# Patient Record
Sex: FEMALE | Race: BLACK OR AFRICAN AMERICAN | NOT HISPANIC OR LATINO | Employment: OTHER | URBAN - METROPOLITAN AREA
[De-identification: names, ages, dates, MRNs, and addresses within clinical notes are randomized per-mention and may not be internally consistent; named-entity substitution may affect disease eponyms.]

---

## 2017-12-30 RX ORDER — INDOMETHACIN 25 MG/1
25 CAPSULE ORAL 3 TIMES DAILY PRN
Qty: 42 CAPSULE | Refills: 1 | Status: SHIPPED | OUTPATIENT
Start: 2017-12-30

## 2019-08-29 ENCOUNTER — TRANSCRIBE ORDERS (OUTPATIENT)
Dept: PHYSICAL THERAPY | Facility: HOSPITAL | Age: 69
End: 2019-08-29

## 2019-08-29 DIAGNOSIS — G89.29 CHRONIC LEFT HIP PAIN: Primary | ICD-10-CM

## 2019-08-29 DIAGNOSIS — M25.552 CHRONIC LEFT HIP PAIN: Primary | ICD-10-CM

## 2019-09-20 ENCOUNTER — TREATMENT (OUTPATIENT)
Dept: PHYSICAL THERAPY | Facility: CLINIC | Age: 69
End: 2019-09-20

## 2019-09-20 DIAGNOSIS — M25.60 STIFF JOINT: ICD-10-CM

## 2019-09-20 DIAGNOSIS — M25.552 ACUTE HIP PAIN, LEFT: Primary | ICD-10-CM

## 2019-09-20 PROCEDURE — 97161 PT EVAL LOW COMPLEX 20 MIN: CPT | Performed by: PHYSICAL THERAPIST

## 2019-09-20 PROCEDURE — 97110 THERAPEUTIC EXERCISES: CPT | Performed by: PHYSICAL THERAPIST

## 2019-09-20 NOTE — PROGRESS NOTES
Physical Therapy Initial Evaluation and Plan of Care      Patient: Teresa Valente   : 1950  Diagnosis/ICD-10 Code:  Acute hip pain, left [M25.552]  Referring practitioner: Cira Ugalde MD  Date of Initial Visit: 2019  Today's Date: 2019  Patient seen for 1 sessions           Subjective Evaluation    History of Present Illness  Mechanism of injury: Patient complains of L hip pain that began insidiously 3-4 month ago. The pain is intermittent and she describes it as a dull ache. It doesn't bother her daily. The pain bothers her the most while sitting but occassionally it causes discomfort when walking. Patient is a member at [employer] and reports some of the exercises she was doing for her knee aggravate her hip (squats, leg press). PMH of B TKAs ( L,  R).      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) Quality of life: good    Pain  Current pain ratin  At best pain ratin  At worst pain ratin  Location: L hip  Quality: dull ache  Relieving factors: change in position and medications (tylenol)  Aggravating factors: prolonged positioning, ambulation and stairs (sitting)  Progression: worsening    Social Support  Lives in: multiple-level home  Lives with: significant other    Hand dominance: left    Treatments  Previous treatment: medication  Patient Goals  Patient goals for therapy: decreased pain and increased motion             Objective       Postural Observations  Seated posture: fair  Standing posture: fair    Additional Postural Observation Details  Forward head, rounded shoulders, moderate kyphosis    Strength/Myotome Testing     Left Hip   Planes of Motion   Flexion: 3+  Abduction: 3+    Right Hip   Planes of Motion   Flexion: 4-    Left Knee   Flexion: 4+  Extension: 4+    Right Knee   Flexion: 4+  Extension: 4+    Left Ankle/Foot   Dorsiflexion: 4+  Plantar flexion: 4+    Right Ankle/Foot   Dorsiflexion: 4+  Plantar flexion: 4+    Tests      Left Hip   Positive TUTU.   Negative scour.     Ambulation     Comments   Wide HANH with feet externally rotated, mild L LE antalgia     See Exercise, Manual, and Modality Logs for complete treatment.    Assessment & Plan     Assessment  Impairments: abnormal gait, activity intolerance, impaired physical strength and pain with function  Assessment details: Teresa Valente is a 69 y.o. year-old female referred to physical therapy for acute left hip pain. The pain started insidiously about 3-4 months ago and feels like a dull ache. It seems to worsen with prolonged sitting and sometimes with walking. She presents with a evolving clinical presentation.  She has comorbidities of B knee pain from prior TKAs which may affect her progress in the plan of care. There are no known personal factors at this time. Signs and symptoms are consistent with physical therapy diagnosis of acute left hip pain. Positive Fabers and weakness in L hip indicate possibility of arthritis. Patient is appropriate for skilled aquatic physical therapy in order to reduce pain and increase ease with daily mobility.   Prognosis: good  Functional Limitations: walking, sitting and standing  Goals  Plan Goals: Short Term Goals for completion in 30 days:   -Patient will report a reduction in pain for 12-24 hours or greater following aquatic therapy session  -Patient will demonstrate good core stabilization strength with advanced  aquatic exercises such as bicycle kicks and tuck ups to help improve postural stability    LTGs for completion within 90 days:  -Patient will demonstrate independence with water walks and stretches to promote independent managment of condition  -Patient will improve 5xSTS from 14 seconds to </=12 seconds in order to decrease risk of falls and increase functional strength  -Patient will increase L hip flex strength to 4/5 or greater to increase LE stability during gait  -Patient will improve score on HOOS from 10 to </=7 in  order to improve quality of life    Plan  Therapy options: will be seen for skilled physical therapy services  Planned therapy interventions: stretching, therapeutic activities, strengthening, home exercise program and gait training  Other planned therapy interventions: Aquatic therapy  Frequency: 36 visits.  Plan details: Aquatic therapy for core stabilization, LE strength/stability, gait training, balance, and posture        Timed:  Manual Therapy:         mins  92663;  Therapeutic Exercise:    8     mins  17493;     Neuromuscular [place]:        mins  48357;    Therapeutic Activity:          mins  29033;     Gait Training:           mins  04410;     Ultrasound:          mins  99444;    Electrical Stimulation:         mins  32162 ( );  Iontophoresis         mins 26993      Untimed:  Electrical Stimulation:         mins  34180 ( );  Mechanical Traction:         mins  66738;     Timed Treatment:   8   mins   Total Treatment:     38   mins    PT SIGNATURE: Chary Gant PT   DATE TREATMENT INITIATED: 9/20/2019    Initial Certification  Certification Period: 12/19/2019  I certify that the therapy services are furnished while this patient is under my care.  The services outlined above are required by this patient, and will be reviewed every 90 days.     PHYSICIAN: Cira Ugalde MD      DATE:     Please sign and return via fax to  .. Thank you, [facility].

## 2019-09-23 ENCOUNTER — TREATMENT (OUTPATIENT)
Dept: PHYSICAL THERAPY | Facility: CLINIC | Age: 69
End: 2019-09-23

## 2019-09-23 DIAGNOSIS — M25.60 STIFF JOINT: ICD-10-CM

## 2019-09-23 DIAGNOSIS — M25.552 ACUTE HIP PAIN, LEFT: Primary | ICD-10-CM

## 2019-09-23 PROCEDURE — 97113 AQUATIC THERAPY/EXERCISES: CPT | Performed by: PHYSICAL THERAPIST

## 2019-09-23 NOTE — PROGRESS NOTES
Physical Therapy Daily Progress Note    Patient: Teresa Valente   : 1950  Diagnosis/ICD-10 Code:  Acute hip pain, left [M25.552]  Referring practitioner: Cira Ugalde MD  Date of Initial Visit: Type: THERAPY  Noted: 2019  Today's Date: 2019  Patient seen for 2 sessions             Subjective Evaluation    Pain  Current pain ratin           Objective   See Exercise, Manual, and Modality Logs for complete treatment.       Assessment & Plan     Assessment  Assessment details: Tenderness L greater trochanter. Instructed patient in aquatic exercise for treatment of L hip pain. Session completed without aggravation of L hip symptoms. Teresa is unsteady in the pool, SBA for all exercises.        Progress per Plan of Care           Timed:  Aquatic Therapy    40     mins 81168;    Kosta Hager, PT  Physical Therapist

## 2019-09-30 ENCOUNTER — TREATMENT (OUTPATIENT)
Dept: PHYSICAL THERAPY | Facility: CLINIC | Age: 69
End: 2019-09-30

## 2019-09-30 DIAGNOSIS — M25.552 ACUTE HIP PAIN, LEFT: Primary | ICD-10-CM

## 2019-09-30 DIAGNOSIS — M25.60 STIFF JOINT: ICD-10-CM

## 2019-09-30 PROCEDURE — 97113 AQUATIC THERAPY/EXERCISES: CPT | Performed by: PHYSICAL THERAPIST

## 2019-09-30 NOTE — PROGRESS NOTES
Physical Therapy Daily Progress Note    Patient: Teresa Valente   : 1950  Diagnosis/ICD-10 Code:  Acute hip pain, left [M25.552]  Referring practitioner: Cira Ugalde MD  Date of Initial Visit: Type: THERAPY  Noted: 2019  Today's Date: 2019  Patient seen for 3 sessions             Subjective Evaluation    History of Present Illness    Subjective comment: I did really well after last session, no increased symptomsPain  Current pain rating: 3           Objective   See Exercise, Manual, and Modality Logs for complete treatment.       Assessment & Plan     Assessment  Assessment details: Patient indicates she felt good without any issues after last initial aquatic session.  Continued with aquatic ex as previous progressing a few and added paddle rows in partial squat (B and alt) and latera step ups without issue during session.  She was able to perform some LE ex without UE support but did need rail support on a few.  Intermittent cuing for posture, core activation, and correct form/technique with therex.     Plan:  Continue working on hip mobility, flexibility, and core/hip strength/stabilization advancing as appropriate/tolerated.          Progress per Plan of Care and Progress strengthening /stabilization /functional activity           Timed:  Aquatic Therapy    30     mins 82524;    Quita Berger, PT  Physical Therapist

## 2019-10-02 ENCOUNTER — TREATMENT (OUTPATIENT)
Dept: PHYSICAL THERAPY | Facility: CLINIC | Age: 69
End: 2019-10-02

## 2019-10-02 DIAGNOSIS — M25.552 ACUTE HIP PAIN, LEFT: Primary | ICD-10-CM

## 2019-10-02 DIAGNOSIS — M25.60 STIFF JOINT: ICD-10-CM

## 2019-10-02 PROCEDURE — 97113 AQUATIC THERAPY/EXERCISES: CPT | Performed by: PHYSICAL THERAPIST

## 2019-10-02 NOTE — PROGRESS NOTES
"Physical Therapy Daily Progress Note    Patient: Teresa Valente   : 1950  Diagnosis/ICD-10 Code:  Acute hip pain, left [M25.552]  Referring practitioner: Cira Ugalde MD  Date of Initial Visit: Type: THERAPY  Noted: 2019  Today's Date: 10/2/2019  Patient seen for 4 sessions             Subjective Evaluation    History of Present Illness    Subjective comment: \"Feeling better\". No soreness after last visitPain  Current pain ratin  Location: L hip           Objective   See Exercise, Manual, and Modality Logs for complete treatment.       Assessment & Plan     Assessment  Assessment details: Patient with continued improvement in subjective report of hip pain.  Progression of ex to include tuck ups & marching in place.  Mild pain with abduction movements in water, though pain did not persist & patient with no increased pain at the end of the treatment session.  Intermittent cueing for core stabilization with exercises.    P: Patient scheduled next week, then will be out of country the following week prior to returning to aquatic therapy.        Progress strengthening /stabilization /functional activity           Timed:  Aquatic Therapy    45     mins 79640;    Betzaida Jeffery, PT  Physical Therapist    "

## 2019-10-07 ENCOUNTER — TREATMENT (OUTPATIENT)
Dept: PHYSICAL THERAPY | Facility: CLINIC | Age: 69
End: 2019-10-07

## 2019-10-07 DIAGNOSIS — M25.60 STIFF JOINT: ICD-10-CM

## 2019-10-07 DIAGNOSIS — M25.552 ACUTE HIP PAIN, LEFT: Primary | ICD-10-CM

## 2019-10-07 PROCEDURE — 97113 AQUATIC THERAPY/EXERCISES: CPT | Performed by: PHYSICAL THERAPIST

## 2019-10-07 NOTE — PROGRESS NOTES
Physical Therapy Daily Progress Note    Patient: Teresa Valente   : 1950  Diagnosis/ICD-10 Code:  Acute hip pain, left [M25.552]  Referring practitioner: Cira Ugalde MD  Date of Initial Visit: Type: THERAPY  Noted: 2019  Today's Date: 10/7/2019  Patient seen for 5 sessions             Subjective Evaluation    History of Present Illness  Mechanism of injury: Pt reports left hip pain 2/10 and that she is seeing improvement from aquatic intervention.            Objective   See Exercise, Manual, and Modality Logs for complete treatment.       Assessment & Plan     Assessment  Assessment details: Progressed to standing running man with LN support with no loss of balance. Discussed stretching strategies for while she is on trip. She feels she is doing well and is comfortable with 1 remaining visit being her last. She reports adls are improving and she feels that left hip and knee have good strength. lle mmt grossly 4/5.    Continue to progress towards dc discuss hep at next visit and make sure pt is comfortable managing left hip at home.        Progress per Plan of Care and Progress strengthening /stabilization /functional activity           Timed:  Aquatic Therapy    40     mins 65642;    D[place] Salmon, PT DPT  Physical Therapist

## 2020-02-20 ENCOUNTER — TRANSCRIBE ORDERS (OUTPATIENT)
Dept: PHYSICAL THERAPY | Facility: CLINIC | Age: 70
End: 2020-02-20

## 2020-02-20 DIAGNOSIS — M79.621 AXILLARY PAIN, RIGHT: Primary | ICD-10-CM

## 2020-02-20 DIAGNOSIS — M79.18 MUSCULOSKELETAL PAIN: ICD-10-CM

## 2020-03-04 ENCOUNTER — HOSPITAL ENCOUNTER (OUTPATIENT)
Dept: PHYSICAL THERAPY | Facility: HOSPITAL | Age: 70
Setting detail: THERAPIES SERIES
Discharge: HOME | End: 2020-03-04

## 2020-03-04 DIAGNOSIS — M25.511 CHRONIC RIGHT SHOULDER PAIN: Primary | ICD-10-CM

## 2020-03-04 DIAGNOSIS — G89.29 CHRONIC RIGHT SHOULDER PAIN: Primary | ICD-10-CM

## 2020-03-04 DIAGNOSIS — M54.6 CHRONIC RIGHT-SIDED THORACIC BACK PAIN: ICD-10-CM

## 2020-03-04 DIAGNOSIS — G89.29 CHRONIC RIGHT-SIDED THORACIC BACK PAIN: ICD-10-CM

## 2020-03-04 PROCEDURE — 97161 PT EVAL LOW COMPLEX 20 MIN: CPT

## 2020-03-04 PROCEDURE — 97110 THERAPEUTIC EXERCISES: CPT

## 2020-03-04 NOTE — THERAPY EVALUATION
Outpatient Physical Therapy Ortho Initial Evaluation  [facility]     Patient Name: Teresa Valente  : 1950  MRN: [id number]  Today's Date: 3/4/2020      Visit Date: 2020    There is no problem list on file for this patient.       Past Medical History:   Diagnosis Date   • Allergic    • Arthritis    • Asthma    • Cancer (CMS/HCC)     breast- in remission   • Sinusitis         History reviewed. No pertinent surgical history.    Visit Dx:     ICD-10-CM ICD-9-CM   1. Chronic right shoulder pain M25.511 719.41    G89.29 338.29   2. Chronic right-sided thoracic back pain M54.6 724.1    G89.29 338.29         Patient History     Row Name 20 1300             History    Chief Complaint  Pain  -LB      Type of Pain  Shoulder pain  -LB      Date Current Problem(s) Began  19  -LB      Brief Description of Current Complaint  Pt reports 3-4 months of worsening R shoulder, scapular, and mid thoracic back pain. She had R mastectomy . Over last 4 years she has had worsening R shoulder pain that began when she started going up in weight at the gym. She saw orthopedic doctor who did Xray that revealed end stage OA. She was told to have TSA 3 years ago. She is L handed. She managed symptoms with NSAIDs and avoiding movement but feels it has worsened over last 4 months. She reports worse pain with reaching overhead and spending time in forward flexed position with recent symptoms radiating along R scapula and into mid back. Pt works 3 days a week at [employer] where she occasionally does pt lifting but not usually. Otherwise she is retired. 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  -LB      Patient seeing anyone else for problem(s)?  yes  -LB      How has patient tried to help current problem?  medication  -LB      What clinical tests have you had for this problem?  X-ray  -LB      Results of Clinical Tests  endstage OA  -LB      History of Previous Related Injuries  chronic R shoulder pain  -LB         Pain     Pain Location  Shoulder  -LB      Pain at Present  4  -LB      Pain at Best  2  -LB      Pain at Worst  8  -LB      Pain Frequency  Intermittent  -LB      Pain Description  Aching;Stabbing;Sore;Spasm  -LB      What Performance Factors Make the Current Problem(s) WORSE?  reaching overhead, leaning forward for extended periods of time  -LB      What Performance Factors Make the Current Problem(s) BETTER?  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  -LB      Tolerance Time- Standing  pain if leaning forward  -LB      Tolerance Time- Sitting  no issue  -LB      Tolerance Time- Walking  no issue  -LB      Tolerance Time- Lying  unable to lay on R shoulder   -LB      Is your sleep disturbed?  No  -LB      What position do you sleep in?  Left sidelying  -LB      Difficulties at work?  Pain with pill distribution.  -LB      Difficulties with ADL's?  Pain with hair combing.  -LB      Difficulties with recreational activities?  Unable to perfrom.  -LB         Fall Risk Assessment    Any falls in the past year:  No  -LB         Services    Prior Rehab/Home Health Experiences  No  -LB      Are you currently receiving Home Health services  No  -LB      Do you plan to receive Home Health services in the near future  No  -LB         Daily Activities    Primary Language  English  -LB      Pt Participated in POC and Goals  Yes  -LB         Safety    Are you being hurt, hit, or frightened by anyone at home or in your life?  No  -LB      Are you being neglected by a caregiver  No  -LB         User Key  (r) = Recorded By, (t) = Taken By, (c) = Cosigned By    Initials Name Provider Type    LB Abbey Whyte, PT Physical Therapist          PT Ortho     Row Name 03/04/20 1400       Subjective Comments    Subjective Comments  I went to my MD bc I was worried since I had hx of mastectomy but she said I was doing ok from that standpoint and it was more shoulder related.  -LB       Subjective Pain    Able to rate subjective pain?  yes  -LB    Pre-Treatment Pain Level  4  -LB       Posture/Observations    Posture/Observations Comments  forward head, rounded shoulders  -LB       Myotomal Screen- Upper Quarter Clearing    Shoulder flexion (C5)  Right:;4- (Good -);Left:;4 (Good)  -LB    Elbow flexion/wrist extension (C6)  Bilateral:;5 (Normal)  -LB    Elbow extension/wrist flexion (C7)  Bilateral:;5 (Normal)  -LB      WNL  -LB       Cervical/Shoulder ROM Screen    Cervical flexion  Normal  -LB    Cervical extension  Normal  -LB    Cervical lateral flexion  Normal  -LB    Cervical rotation  Normal  -LB    Shoulder elevation   Normal  -LB       Shoulder Girdle Accessory Motions    Anterior glide of humerus  Right:;Hypomobile  -LB    Inferior glide of humerus  Right:;Hypomobile  -LB    Protraction mobility of scapula  Right:;Hypomobile  -LB    Retraction mobility of scapula  Right:;Hypomobile  -LB       Shoulder Girdle Palpation    Deltoid  Right:;Atrophied  -LB    Upper Trap  Right:;Guarded/taut  -LB    Middle Trap  Right:;Guarded/taut;Tender  -LB    Rhomboid  Right:;Guarded/taut;Tender;Trigger point  -LB       Right Upper Ext    Rt Shoulder Abduction AROM  80 deg  -LB    Rt Shoulder Abduction PROM  90 deg  -LB    Rt Shoulder Flexion AROM  100 deg  -LB    Rt Shoulder Flexion PROM  120 deg  -LB    Rt Shoulder External Rotation AROM  HOLLY to ipsilateral ear  -LB    Rt Shoulder External Rotation PROM  20 deg in POS  -LB    Rt Shoulder Internal Rotation AROM  FIR to L4  -LB    Rt Shoulder Internal Rotation PROM  50  deg in POS  -LB       Left Upper Ext    Lt Shoulder Abduction AROM  140 deg  -LB    Lt Shoulder Flexion AROM  150 deg  -LB    Lt Shoulder External Rotation AROM  HOLLY to C7  -LB    Lt Shoulder Internal Rotation AROM  FIR to L4  -LB       MMT (Manual Muscle Testing)    General MMT Comments  B abduction 4/5 with compensation on R  -LB       Sensation    Sensation WNL?  WNL  -LB      User Key  (r) = Recorded By, (t) = Taken By, (c) = Cosigned By    Initials Name Provider Type    LB Abbey Whyte, PT Physical Therapist                      Therapy Education  Education Details: issued HEP, discussed stepping one LE forward under cabinet for dishes/pill distribution tasks, discussed scapular mechanics, thoracic mobility  Given: Symptoms/condition management, HEP, Posture/body mechanics  Program: New  How Provided: Verbal, Demonstration, Written  Provided to: Patient  Level of Understanding: Teach back education performed, Demonstrated, Verbalized     PT OP Goals     Row Name 03/04/20 1500          PT Short Term Goals    STG Date to Achieve  03/18/20  -LB     STG 1  Pt will demonstrate understanding and compliance with initial HEP.  -LB     STG 1 Progress  New  -LB     STG 2  Pt will report improved tolerance to dish washing and pill distribution tasks by 50% or better.  -LB     STG 2 Progress  New  -LB     STG 3  Pt will demonstrate improved ER PROM to 40 deg or better.  -LB     STG 3 Progress  New  -LB        Long Term Goals    LTG Date to Achieve  04/03/20  -LB     LTG 1  Pt will demonstrate AROM RUE flexion/abduction to 120 deg to improve her ability to perform dressing and grooming tasks.  -LB     LTG 1 Progress  New  -LB     LTG 2  Pt will report pain at worse dec from 8/10 to 5/10 or less.  -LB     LTG 2 Progress  New  -LB     LTG 3  Pt will demonstrate understanding of advanced HEP to allow her to continue to manage R shoulder pain until she is ready for TSA.  -LB     LTG 3 Progress  New  -LB        Time Calculation     PT Goal Re-Cert Due Date  06/02/20  -LB       User Key  (r) = Recorded By, (t) = Taken By, (c) = Cosigned By    Initials Name Provider Type    Abbey Del Valle, BETINA Physical Therapist          PT Assessment/Plan     Row Name 03/04/20 1505          PT Assessment    Functional Limitations  Performance in work activities;Limitation in home management;Performance in self-care ADL;Limitations in functional capacity and performance;Performance in leisure activities  -LB     Impairments  Impaired flexibility;Poor body mechanics;Posture;Range of motion;Pain;Joint mobility;Muscle strength;Joint integrity  -LB     Assessment Comments  Pt is 70 y.o. female referred to outpatient physical therapy for evaluation and treatment of  evolving  right shoulder, periscapular, and thoracic spine pain that is described as an aching, radiating pain that is occasionally stabbing. It is worse with reaching activities and FF posture.  Patient presents with dec AROM/PROM of R shoulder, impaired R scapular strength, dec thoracic spine mobility, and poor posture. PMHx consistent with hx of R mastectomy 2004, chronic R shoulder pain. Personal factors affecting her care include poor posture, work tasks requiring standing in forward flexed position, end stage OA R GH joint. Pt demonstrates signs and symptoms  consistent with degenerative changes of R shoulder leading to pain due to compensatory movements. Pt is limited in their ability to participate in strength training, work tasks, grooming tasks. She will benefit from continued skilled PT services to address functional deficits. Thank you for this referral.  -LB     Please refer to paper survey for additional self-reported information  Yes  -LB     Rehab Potential  Good  -LB     Patient/caregiver participated in establishment of treatment plan and goals  Yes  -LB     Patient would benefit from skilled therapy intervention  Yes  -LB        PT Plan    PT Frequency  2x/week  -LB     Predicted  Duration of Therapy Intervention (Therapy Eval)  8-12 visits  -LB     Planned CPT's?  PT EVAL LOW COMPLEXITY: 34680;PT RE-EVAL: 85219;PT THER ACT EA 15 MIN: 20893;PT THER PROC EA 15 MIN: 97801;PT MANUAL THERAPY EA 15 MIN: 73619;PT NEUROMUSC RE-EDUCATION EA 15 MIN: 34121;PT SELF CARE/HOME MGMT/TRAIN EA 15: 05422;PT HOT OR COLD PACK TREAT MCARE;PT HOT/COLD PACK WC NONMCARE: 48716  -LB     PT Plan Comments  Assess tolerance to HEP, continue thoracic mobility, scapular strengthening, consider supine on pool noodle alternating flexion, seated thoracic extension, tband row/extension, lat pull, RTC strengthening as tolerated.  -LB       User Key  (r) = Recorded By, (t) = Taken By, (c) = Cosigned By    Initials Name Provider Type    Abbey Del Valle, PT Physical Therapist            OP Exercises     Row Name 03/04/20 1400             Subjective Comments    Subjective Comments  I went to my MD bc I was worried since I had hx of mastectomy but she said I was doing ok from that standpoint and it was more shoulder related.  -LB         Subjective Pain    Able to rate subjective pain?  yes  -LB      Pre-Treatment Pain Level  4  -LB         Total Minutes    00143 - PT Therapeutic Exercise Minutes  15  -LB         Exercise 1    Exercise Name 1  shoulder rolls  -LB      Reps 1  10  -LB         Exercise 2    Exercise Name 2  scapular retraction  -LB      Reps 2  10  -LB      Time 2  5  -LB         Exercise 3    Exercise Name 3  supine AAROM flexion  -LB      Reps 3  10  -LB      Time 3  5  -LB         Exercise 4    Exercise Name 4  supine HA  -LB      Reps 4  10  -LB      Additional Comments  RTB  -LB        User Key  (r) = Recorded By, (t) = Taken By, (c) = Cosigned By    Initials Name Provider Type    Abbey Del Valle, PT Physical Therapist                                  Time Calculation:     Start Time: 1345  Stop Time: 1430  Time Calculation (min): 45 min  Total Timed Code Minutes- PT: 15 minute(s)     Therapy Charges for  Today     Code Description Service Date Service Provider Modifiers Qty    91728935434 HC PT THER PROC EA 15 MIN 3/4/2020 Abbey Whyte, PT GP 1    71294707696 HC PT EVAL LOW COMPLEXITY 2 3/4/2020 Abbey Whyte, PT GP 1                    Abbey Whyte, PT  3/4/2020

## 2020-03-17 ENCOUNTER — APPOINTMENT (OUTPATIENT)
Dept: PHYSICAL THERAPY | Facility: HOSPITAL | Age: 70
End: 2020-03-17

## 2020-03-19 ENCOUNTER — HOSPITAL ENCOUNTER (OUTPATIENT)
Dept: PHYSICAL THERAPY | Facility: HOSPITAL | Age: 70
Setting detail: THERAPIES SERIES
Discharge: HOME | End: 2020-03-19

## 2020-03-19 DIAGNOSIS — M25.552 ACUTE HIP PAIN, LEFT: ICD-10-CM

## 2020-03-19 DIAGNOSIS — M25.511 CHRONIC RIGHT SHOULDER PAIN: Primary | ICD-10-CM

## 2020-03-19 DIAGNOSIS — G89.29 CHRONIC RIGHT SHOULDER PAIN: Primary | ICD-10-CM

## 2020-03-19 DIAGNOSIS — M54.6 CHRONIC RIGHT-SIDED THORACIC BACK PAIN: ICD-10-CM

## 2020-03-19 DIAGNOSIS — G89.29 CHRONIC RIGHT-SIDED THORACIC BACK PAIN: ICD-10-CM

## 2020-03-19 DIAGNOSIS — M25.60 STIFF JOINT: ICD-10-CM

## 2020-03-19 PROCEDURE — 97110 THERAPEUTIC EXERCISES: CPT

## 2020-03-19 PROCEDURE — 97140 MANUAL THERAPY 1/> REGIONS: CPT

## 2020-03-19 NOTE — THERAPY TREATMENT NOTE
Outpatient Physical Therapy Ortho Treatment Note  [facility]     Patient Name: Teresa Valente  : 1950  MRN: [id number]  Today's Date: 3/19/2020      Visit Date: 2020    Visit Dx:    ICD-10-CM ICD-9-CM   1. Chronic right shoulder pain M25.511 719.41    G89.29 338.29   2. Chronic right-sided thoracic back pain M54.6 724.1    G89.29 338.29   3. Acute hip pain, left M25.552 719.45   4. Stiff joint M25.60 719.50       There is no problem list on file for this patient.       Past Medical History:   Diagnosis Date   • Allergic    • Arthritis    • Asthma    • Cancer (CMS/[facility])     breast- in remission   • Sinusitis         No past surgical history on file.    PT Ortho     Row Name 20 1500       Right Upper Ext    Rt Shoulder Abduction PROM  100  -SI    Rt Shoulder Flexion PROM  136  -SI    Rt Shoulder External Rotation PROM  40  -SI    Rt Shoulder Internal Rotation PROM  60  -SI    Rt Upper Extremity Comments   pain end ranges with PROM  -SI    Row Name 20 1400       Subjective Comments    Subjective Comments  pain about 7 because just off work and lot of lifting.  will now be off work  -SI       Subjective Pain    Able to rate subjective pain?  yes  -SI    Pre-Treatment Pain Level  7  -SI      User Key  (r) = Recorded By, (t) = Taken By, (c) = Cosigned By    Initials Name Provider Type    Loren Velasqeuz PTA Physical Therapy Assistant                      PT Assessment/Plan     Row Name 20 1534          PT Assessment    Assessment Comments  pt tolerated ex well in PT and fels she knows current HEP.  -SI       User Key  (r) = Recorded By, (t) = Taken By, (c) = Cosigned By    Initials Name Provider Type    Loren Velasquez PTA Physical Therapy Assistant            OP Exercises     Row Name 20 1500 20 1400          Subjective Comments    Subjective Comments  --  pain about 7 because just off work and lot of lifting.  will now be off work  -SI        Subjective Pain     Able to rate subjective pain?  --  yes  -SI     Pre-Treatment Pain Level  --  7  -SI        Total Minutes    77926 - PT Therapeutic Exercise Minutes  --  30  -SI     37131 - PT Manual Therapy Minutes  15  -SI  --        Exercise 1    Exercise Name 1  --  shoulder rolls  -SI     Reps 1  --  10  -SI        Exercise 2    Exercise Name 2  --  scapular retraction  -SI     Reps 2  --  10  -SI     Time 2  --  5  -SI        Exercise 3    Exercise Name 3  --  supine AAROM flexion and ER  -SI     Sets 3  --  1  -SI     Reps 3  --  10  -SI     Time 3  --  10  -SI        Exercise 4    Exercise Name 4  --  supine HA  -SI     Reps 4  --  10  -SI     Additional Comments  --  RTB  -SI        Exercise 5    Exercise Name 5  --  Sh pulley flexion and scaption  -SI     Time 5  --  5 min  -SI     Additional Comments  --  discueesed gettin one for home use  -SI        Exercise 6    Exercise Name 6  --  scapular retraction with RTB  -SI     Reps 6  --  20  -SI        Exercise 7    Exercise Name 7  --  Isometric IR aand ER  -SI     Sets 7  --  1  -SI     Reps 7  --  10  -SI       User Key  (r) = Recorded By, (t) = Taken By, (c) = Cosigned By    Initials Name Provider Type    Loren Velasquez PTA Physical Therapy Assistant                      Manual Rx (last 36 hours)      Manual Treatments     Row Name 03/19/20 1500             Total Minutes    51196 - PT Manual Therapy Minutes  15  -SI         Manual Rx 1    Manual Rx 1 Location  right shoulder  -SI      Manual Rx 1 Type  PROM as tolerated  -SI      Manual Rx 1 Duration  15  -SI        User Key  (r) = Recorded By, (t) = Taken By, (c) = Cosigned By    Initials Name Provider Type    Loren Velasquez PTA Physical Therapy Assistant              Therapy Education  Given: HEP, Symptoms/condition management  Program: Progressed  How Provided: Verbal, Demonstration, Written  Provided to: Patient  Level of Understanding: Teach back education performed              Time Calculation:   Start  Time: 1445  Stop Time: 1535  Time Calculation (min): 50 min  Total Timed Code Minutes- PT: 45 minute(s)  Therapy Charges for Today     Code Description Service Date Service Provider Modifiers Qty    44613325873 HC PT THER PROC EA 15 MIN 3/19/2020 Loren Turner, PTA GP 2    99001970849 HC PT MANUAL THERAPY EA 15 MIN 3/19/2020 Loren Turner, PTA GP 1                    Loren Turner PTA  3/19/2020

## 2020-03-24 ENCOUNTER — APPOINTMENT (OUTPATIENT)
Dept: PHYSICAL THERAPY | Facility: HOSPITAL | Age: 70
End: 2020-03-24

## 2020-03-26 ENCOUNTER — APPOINTMENT (OUTPATIENT)
Dept: PHYSICAL THERAPY | Facility: HOSPITAL | Age: 70
End: 2020-03-26

## 2020-03-31 ENCOUNTER — APPOINTMENT (OUTPATIENT)
Dept: PHYSICAL THERAPY | Facility: HOSPITAL | Age: 70
End: 2020-03-31

## 2020-04-02 ENCOUNTER — APPOINTMENT (OUTPATIENT)
Dept: PHYSICAL THERAPY | Facility: HOSPITAL | Age: 70
End: 2020-04-02

## 2020-04-07 ENCOUNTER — APPOINTMENT (OUTPATIENT)
Dept: PHYSICAL THERAPY | Facility: HOSPITAL | Age: 70
End: 2020-04-07

## 2020-04-09 ENCOUNTER — APPOINTMENT (OUTPATIENT)
Dept: PHYSICAL THERAPY | Facility: HOSPITAL | Age: 70
End: 2020-04-09

## 2020-12-08 ENCOUNTER — DOCUMENTATION (OUTPATIENT)
Dept: PHYSICAL THERAPY | Facility: CLINIC | Age: 70
End: 2020-12-08

## 2020-12-08 DIAGNOSIS — M25.552 ACUTE HIP PAIN, LEFT: Primary | ICD-10-CM

## 2020-12-08 DIAGNOSIS — M25.60 STIFF JOINT: ICD-10-CM

## 2020-12-08 NOTE — PROGRESS NOTES
Discharge Summary  Discharge Summary from Physical Therapy Report      Dates  PT visit: 9/20/19 to 10.7.19  Number of Visits: 5     Discharge Status of Patient: Patient was doing well with aquatic therapy and had 1 additional visit scheduled but did not show for her final aquatic therapy session    Goals: Partially Met    Discharge Plan: Continue with current home exercise program as instructed    Comments Patient to call should she have questions.    Date of Discharge 12/8/20        Quita Berger, PT  Physical Therapist

## 2021-02-17 ENCOUNTER — DOCUMENTATION (OUTPATIENT)
Dept: PHYSICAL THERAPY | Facility: CLINIC | Age: 71
End: 2021-02-17

## 2021-09-30 ENCOUNTER — TREATMENT (OUTPATIENT)
Dept: PHYSICAL THERAPY | Facility: CLINIC | Age: 71
End: 2021-09-30

## 2021-09-30 DIAGNOSIS — Z74.09 IMPAIRED FUNCTIONAL MOBILITY AND ACTIVITY TOLERANCE: ICD-10-CM

## 2021-09-30 DIAGNOSIS — M19.019 ARTHRITIS OF SHOULDER: ICD-10-CM

## 2021-09-30 DIAGNOSIS — M25.511 RIGHT SHOULDER PAIN, UNSPECIFIED CHRONICITY: Primary | ICD-10-CM

## 2021-09-30 PROCEDURE — 97162 PT EVAL MOD COMPLEX 30 MIN: CPT | Performed by: PHYSICAL THERAPIST

## 2021-09-30 PROCEDURE — 97110 THERAPEUTIC EXERCISES: CPT | Performed by: PHYSICAL THERAPIST

## 2021-09-30 PROCEDURE — 97530 THERAPEUTIC ACTIVITIES: CPT | Performed by: PHYSICAL THERAPIST

## 2021-10-04 ENCOUNTER — TREATMENT (OUTPATIENT)
Dept: PHYSICAL THERAPY | Facility: CLINIC | Age: 71
End: 2021-10-04

## 2021-10-04 DIAGNOSIS — M25.511 RIGHT SHOULDER PAIN, UNSPECIFIED CHRONICITY: Primary | ICD-10-CM

## 2021-10-04 DIAGNOSIS — M19.019 ARTHRITIS OF SHOULDER: ICD-10-CM

## 2021-10-04 DIAGNOSIS — Z74.09 IMPAIRED FUNCTIONAL MOBILITY AND ACTIVITY TOLERANCE: ICD-10-CM

## 2021-10-04 PROCEDURE — 97110 THERAPEUTIC EXERCISES: CPT | Performed by: PHYSICAL THERAPIST

## 2021-10-04 PROCEDURE — G0283 ELEC STIM OTHER THAN WOUND: HCPCS | Performed by: PHYSICAL THERAPIST

## 2021-10-04 PROCEDURE — 97140 MANUAL THERAPY 1/> REGIONS: CPT | Performed by: PHYSICAL THERAPIST

## 2021-10-04 NOTE — PROGRESS NOTES
Physical Therapy Daily Progress Note      Visit # 2      Subjective   Did well over the weekend but a little rough this morning. Did a lot of walking over the weekend.    Objective   See Exercise, Manual, and Modality Logs for complete treatment.       Assessment/Plan    Responding well to PT so far with tolerance for increased ex, including strengthening, and decreased pain/impingement with PROM after manual Rx.    Continue to progress per POC as katerin             Manual Therapy:    16     mins  12814;  Therapeutic Exercise:    30     mins  62076;       Estim   15 min 25484      Timed Treatment:   46   mins   Total Treatment:     61   mins    Lovely Guerra PT  Physical Therapist  [place] License #[id number]

## 2021-10-07 ENCOUNTER — TREATMENT (OUTPATIENT)
Dept: PHYSICAL THERAPY | Facility: CLINIC | Age: 71
End: 2021-10-07

## 2021-10-07 DIAGNOSIS — M25.511 RIGHT SHOULDER PAIN, UNSPECIFIED CHRONICITY: Primary | ICD-10-CM

## 2021-10-07 DIAGNOSIS — Z74.09 IMPAIRED FUNCTIONAL MOBILITY AND ACTIVITY TOLERANCE: ICD-10-CM

## 2021-10-07 DIAGNOSIS — M19.019 ARTHRITIS OF SHOULDER: ICD-10-CM

## 2021-10-07 PROCEDURE — 97140 MANUAL THERAPY 1/> REGIONS: CPT | Performed by: PHYSICAL THERAPIST

## 2021-10-07 PROCEDURE — 97110 THERAPEUTIC EXERCISES: CPT | Performed by: PHYSICAL THERAPIST

## 2021-10-07 PROCEDURE — G0283 ELEC STIM OTHER THAN WOUND: HCPCS | Performed by: PHYSICAL THERAPIST

## 2021-10-07 NOTE — PROGRESS NOTES
Physical Therapy Daily Progress Note      Visit # 3      Subjective   May have overdone ex.  Feel like I need to keep my left arm supported or else feels out of alignment    Objective   See Exercise, Manual, and Modality Logs for complete treatment.       Assessment/Plan    R shoulder continues with ROM restrictions but less overall pain vs L.  Increased pain from increased activity L shoulder. Only mild ROM restrictin Left and improves with manual but with signs/sxs impingement and instability.    Progression dependent on MRI results L shdr; continue per POC R shoulder as katerin             Manual Therapy:    18     mins  43308;  Therapeutic Exercise:    27     mins  48853;     Estim   15 min 92133      Timed Treatment:   45   mins   Total Treatment:     60   mins    Lovely Guerra PT  Physical Therapist  [place] License #[id number]

## 2021-10-11 ENCOUNTER — TREATMENT (OUTPATIENT)
Dept: PHYSICAL THERAPY | Facility: CLINIC | Age: 71
End: 2021-10-11

## 2021-10-11 DIAGNOSIS — M19.019 ARTHRITIS OF SHOULDER: ICD-10-CM

## 2021-10-11 DIAGNOSIS — Z74.09 IMPAIRED FUNCTIONAL MOBILITY AND ACTIVITY TOLERANCE: ICD-10-CM

## 2021-10-11 DIAGNOSIS — M25.511 RIGHT SHOULDER PAIN, UNSPECIFIED CHRONICITY: Primary | ICD-10-CM

## 2021-10-11 PROCEDURE — 97110 THERAPEUTIC EXERCISES: CPT | Performed by: PHYSICAL THERAPIST

## 2021-10-11 PROCEDURE — 97140 MANUAL THERAPY 1/> REGIONS: CPT | Performed by: PHYSICAL THERAPIST

## 2021-10-11 PROCEDURE — G0283 ELEC STIM OTHER THAN WOUND: HCPCS | Performed by: PHYSICAL THERAPIST

## 2021-10-11 NOTE — PROGRESS NOTES
Physical Therapy Daily Progress Note      Visit # 4      Subjective   MRI showed deg at C5-6 with posterior displacement, small canal but no stenosis. Did well after last session. Chief c/o stiffness and limited ROM.    Objective   See Exercise, Manual, and Modality Logs for complete treatment.       Assessment/Plan    Continues with signs/sxs impingement left shoulder with mild overall improvement.  R shoulder continues to be limited with mobility but improves some with manual and ex and with minimal pain complaints vs Left shoulder.    Progress per POC as katerin             Manual Therapy:    16     mins  23563;  Therapeutic Exercise:    28     mins  97191;       Estim   15 min 44691      Timed Treatment:   44   mins   Total Treatment:     59   mins    Lovely Guerra PT  Physical Therapist  [place] License #[id number]

## 2021-10-14 ENCOUNTER — TREATMENT (OUTPATIENT)
Dept: PHYSICAL THERAPY | Facility: CLINIC | Age: 71
End: 2021-10-14

## 2021-10-14 DIAGNOSIS — M19.019 ARTHRITIS OF SHOULDER: ICD-10-CM

## 2021-10-14 DIAGNOSIS — Z74.09 IMPAIRED FUNCTIONAL MOBILITY AND ACTIVITY TOLERANCE: ICD-10-CM

## 2021-10-14 DIAGNOSIS — M25.511 RIGHT SHOULDER PAIN, UNSPECIFIED CHRONICITY: Primary | ICD-10-CM

## 2021-10-14 PROCEDURE — G0283 ELEC STIM OTHER THAN WOUND: HCPCS | Performed by: PHYSICAL THERAPIST

## 2021-10-14 PROCEDURE — 97110 THERAPEUTIC EXERCISES: CPT | Performed by: PHYSICAL THERAPIST

## 2021-10-14 PROCEDURE — 97140 MANUAL THERAPY 1/> REGIONS: CPT | Performed by: PHYSICAL THERAPIST

## 2021-10-14 NOTE — PROGRESS NOTES
Physical Therapy Daily Progress Note      Visit # 5      Subjective   R shoulder is feeling pretty good and left shoulder not hurting as much.    Objective   See Exercise, Manual, and Modality Logs for complete treatment.       Assessment/Plan    Decreasing impingement and improving ROM but still with some impingement and mod weakness with ER.  Tolerated progression of scapular strengthening.    Progress strengthening as katerin and continue manual as needed to dec impingement and improve ROM             Manual Therapy:    17     mins  83332;  Therapeutic Exercise:    38     mins  46251;     Estim   15 min 44904      Timed Treatment:   55   mins   Total Treatment:     70   mins    Lovely Guerra PT  Physical Therapist  [place] License #[id number]

## 2021-10-18 ENCOUNTER — TREATMENT (OUTPATIENT)
Dept: PHYSICAL THERAPY | Facility: CLINIC | Age: 71
End: 2021-10-18

## 2021-10-18 DIAGNOSIS — M25.511 RIGHT SHOULDER PAIN, UNSPECIFIED CHRONICITY: Primary | ICD-10-CM

## 2021-10-18 DIAGNOSIS — M19.019 ARTHRITIS OF SHOULDER: ICD-10-CM

## 2021-10-18 DIAGNOSIS — Z74.09 IMPAIRED FUNCTIONAL MOBILITY AND ACTIVITY TOLERANCE: ICD-10-CM

## 2021-10-18 PROCEDURE — 97140 MANUAL THERAPY 1/> REGIONS: CPT | Performed by: PHYSICAL THERAPIST

## 2021-10-18 PROCEDURE — G0283 ELEC STIM OTHER THAN WOUND: HCPCS | Performed by: PHYSICAL THERAPIST

## 2021-10-18 PROCEDURE — 97110 THERAPEUTIC EXERCISES: CPT | Performed by: PHYSICAL THERAPIST

## 2021-10-18 NOTE — PROGRESS NOTES
Physical Therapy Daily Progress Note      Visit # 6      Subjective   Left middle finger started pulling - almost triggering this weekend with some slight N/T.  Shoulders felt OK though - doing better.    Objective   See Exercise, Manual, and Modality Logs for complete treatment.       Assessment/Plan    B shoulders continue to improve with decreasing pain and impingement L shoulder and improved mobility R shoulder.  L shoulder ROM near full but R shill limited.  No reproduction of left finger sxs during session.  Tolerated light progression of ex.    Continue to progress as katerin to minimize pain and impingement and increase strength and stability.           Manual Therapy:    14     mins  77709;  Therapeutic Exercise:    34     mins  45256;       Estim   15 min 34243      Timed Treatment:   48   mins   Total Treatment:     63   mins    Lovely Guerra PT  Physical Therapist  [place] License #[id number]

## 2021-10-21 ENCOUNTER — TREATMENT (OUTPATIENT)
Dept: PHYSICAL THERAPY | Facility: CLINIC | Age: 71
End: 2021-10-21

## 2021-10-21 DIAGNOSIS — M25.511 RIGHT SHOULDER PAIN, UNSPECIFIED CHRONICITY: Primary | ICD-10-CM

## 2021-10-21 DIAGNOSIS — M19.019 ARTHRITIS OF SHOULDER: ICD-10-CM

## 2021-10-21 DIAGNOSIS — Z74.09 IMPAIRED FUNCTIONAL MOBILITY AND ACTIVITY TOLERANCE: ICD-10-CM

## 2021-10-21 PROCEDURE — 97110 THERAPEUTIC EXERCISES: CPT | Performed by: PHYSICAL THERAPIST

## 2021-10-21 PROCEDURE — 97140 MANUAL THERAPY 1/> REGIONS: CPT | Performed by: PHYSICAL THERAPIST

## 2021-10-21 PROCEDURE — G0283 ELEC STIM OTHER THAN WOUND: HCPCS | Performed by: PHYSICAL THERAPIST

## 2021-10-21 NOTE — PROGRESS NOTES
Physical Therapy Daily Progress Note    Visit # : 7  Teresa Valente reports: my shoulders are still stiff and sore L>R and my L middle finger also hurts and triggers. I've done more driving than usual today.      Subjective     Objective   See Exercise, Manual, and Modality Logs for complete treatment.     Assessment/Plan  Pt notes discomfort with manual stretching in scapular plane B with greater ROM deficits on L side.  Pt benefits from tactile cuing to avoid compensatory elbow extension during s/l shoulder ER. No triggering of MF noted with exercise program though pt noting more L shoulder soreness today following exercise program.   Progress per Plan of Care       Timed:  Manual Therapy:    16     mins  60420;  Therapeutic Exercise:    34     mins  53331;       Untimed:  Electrical Stimulation:    15     mins  90001 ( );      Timed Treatment:   50   mins   Total Treatment:     68   mins      Letty Randall, PT  Physical Therapist  [place] License # [id number]

## 2021-10-25 ENCOUNTER — TREATMENT (OUTPATIENT)
Dept: PHYSICAL THERAPY | Facility: CLINIC | Age: 71
End: 2021-10-25

## 2021-10-25 DIAGNOSIS — Z74.09 IMPAIRED FUNCTIONAL MOBILITY AND ACTIVITY TOLERANCE: ICD-10-CM

## 2021-10-25 DIAGNOSIS — M19.019 ARTHRITIS OF SHOULDER: ICD-10-CM

## 2021-10-25 DIAGNOSIS — M25.511 RIGHT SHOULDER PAIN, UNSPECIFIED CHRONICITY: Primary | ICD-10-CM

## 2021-10-25 PROCEDURE — G0283 ELEC STIM OTHER THAN WOUND: HCPCS | Performed by: PHYSICAL THERAPIST

## 2021-10-25 PROCEDURE — 97140 MANUAL THERAPY 1/> REGIONS: CPT | Performed by: PHYSICAL THERAPIST

## 2021-10-25 PROCEDURE — 97110 THERAPEUTIC EXERCISES: CPT | Performed by: PHYSICAL THERAPIST

## 2021-10-25 NOTE — PROGRESS NOTES
Physical Therapy Daily Progress Note      Visit # 8      Subjective   Didn't do much over the weekend so feeling better.  Finger not triggering as much.Less pain in shoulders    Objective   See Exercise, Manual, and Modality Logs for complete treatment.       Assessment/Plan    Continues with gradual overall improvement.  Still with capsular tightness R and B impingement but less than previous.  Tolerated light progression of ex    reassess             Manual Therapy:    17     mins  84281;  Therapeutic Exercise:    33     mins  54088;       Estim   15 min 75017      Timed Treatment:   50   mins   Total Treatment:     70   mins    Lovely Guerra PT  Physical Therapist  [place] License #[id number]

## 2021-10-28 ENCOUNTER — TREATMENT (OUTPATIENT)
Dept: PHYSICAL THERAPY | Facility: CLINIC | Age: 71
End: 2021-10-28

## 2021-10-28 DIAGNOSIS — Z74.09 IMPAIRED FUNCTIONAL MOBILITY AND ACTIVITY TOLERANCE: ICD-10-CM

## 2021-10-28 DIAGNOSIS — M19.019 ARTHRITIS OF SHOULDER: ICD-10-CM

## 2021-10-28 DIAGNOSIS — M25.511 RIGHT SHOULDER PAIN, UNSPECIFIED CHRONICITY: Primary | ICD-10-CM

## 2021-10-28 PROCEDURE — 97110 THERAPEUTIC EXERCISES: CPT | Performed by: PHYSICAL THERAPIST

## 2021-10-28 PROCEDURE — G0283 ELEC STIM OTHER THAN WOUND: HCPCS | Performed by: PHYSICAL THERAPIST

## 2021-10-28 PROCEDURE — 97140 MANUAL THERAPY 1/> REGIONS: CPT | Performed by: PHYSICAL THERAPIST

## 2021-10-28 NOTE — PROGRESS NOTES
Physical Therapy Daily Progress Note      Visit # 9      Subjective   My left middle finger keeps triggering at times.  Usually after repetitive or prolonged use.  Shoulders are pretty good    Objective          Postural Observations  Seated posture: fair    Additional Postural Observation Details  FHP; inc cerv-thor kyphosis     Palpation   Left   Hypertonic in the upper trapezius.   Tenderness of the levator scapulae and upper trapezius.     Right   Hypertonic in the upper trapezius. Tenderness of the levator scapulae and upper trapezius.     Additional Palpation Details  B parascap and L GHJ    Cervical/Thoracic Screen   Cervical range of motion within normal limits with the following exceptions: All WFL without inc sxs    Active Range of Motion   Left Shoulder   Flexion: 137 degrees   Abduction: 120 degrees with pain  External rotation BTH: T2   Internal rotation BTB: T12     Right Shoulder   Flexion: 147 degrees   Abduction: 130 degrees with pain  External rotation BTH: T1 with pain  Internal rotation BTB: T12     Passive Range of Motion     Additional Passive Range of Motion Details  Left > WFL but R < WFL    Strength/Myotome Testing     Left Shoulder     Planes of Motion   Flexion: 4+   Abduction: 4   External rotation at 0°: 4+   Internal rotation at 0°: 5     Isolated Muscles   Middle trapezius: 4   Serratus anterior: 4     Right Shoulder     Planes of Motion   Flexion: 5   Abduction: 4+ (but with hiking)   External rotation at 0°: 5   Internal rotation at 0°: 5     Isolated Muscles   Middle trapezius: 4   Serratus anterior: 4+     Tests   Cervical     Left   Positive active compression ([place]).     Left Shoulder   Positive empty can and Hawkin's.   Negative drop arm.     Right Shoulder   Positive Hawkin's.   Negative empty can.     Additional Tests Details  Substitution but no loss of control drop arm R; Weakness with Empty Can and [place] R but no pain; proximal pain only with [place] on L; negative  "Yelena CRUZ      See Exercise, Manual, and Modality Logs for complete treatment.   Neg [place] CT    Quick dash down to 18.18 from 27.27    Assessment/Plan    Overall improvement in shoulder pain, ROM and strength.  Still with impingement but decreased.  Left middle finger \"triggering\" persists with repetitive/prolonged use but has not occurred during sessions.  Weakness scapular stabilizers.  Making progress towards goals but not met.    Progress scapular mobility and strengthening             Manual Therapy:    20     mins  26799;  Therapeutic Exercise:    45     mins  53163;       Estim   15 min 37847      Timed Treatment:   65   mins   Total Treatment:     80   mins    Lovely Guerra PT  Physical Therapist  [place] License #[id number]  "

## 2021-11-01 ENCOUNTER — TREATMENT (OUTPATIENT)
Dept: PHYSICAL THERAPY | Facility: CLINIC | Age: 71
End: 2021-11-01

## 2021-11-01 DIAGNOSIS — M25.511 RIGHT SHOULDER PAIN, UNSPECIFIED CHRONICITY: Primary | ICD-10-CM

## 2021-11-01 DIAGNOSIS — M19.019 ARTHRITIS OF SHOULDER: ICD-10-CM

## 2021-11-01 DIAGNOSIS — Z74.09 IMPAIRED FUNCTIONAL MOBILITY AND ACTIVITY TOLERANCE: ICD-10-CM

## 2021-11-01 PROCEDURE — 97110 THERAPEUTIC EXERCISES: CPT | Performed by: PHYSICAL THERAPIST

## 2021-11-01 PROCEDURE — G0283 ELEC STIM OTHER THAN WOUND: HCPCS | Performed by: PHYSICAL THERAPIST

## 2021-11-01 PROCEDURE — 97140 MANUAL THERAPY 1/> REGIONS: CPT | Performed by: PHYSICAL THERAPIST

## 2021-11-01 NOTE — PROGRESS NOTES
Physical Therapy Daily Progress Note      Visit # 10      Subjective   Tweaked the right side of my neck somehow this weekend.  Left shoulder feels pretty good.    Objective   See Exercise, Manual, and Modality Logs for complete treatment.       Assessment/Plan    Less impingement signs/sxs left shoulder today. R shoulder still tight but improved and with new neck pain today but not significant and ROm WFL.  Able to progress ex lightly.    Try prone scapular strengthening             Manual Therapy:    20     mins  39467;  Therapeutic Exercise:    45     mins  11843;     Estim   15 min 34250      Timed Treatment:   65   mins   Total Treatment:     80   mins    Lovely Guerra PT  Physical Therapist  [place] License #[id number]

## 2021-11-04 ENCOUNTER — TREATMENT (OUTPATIENT)
Dept: PHYSICAL THERAPY | Facility: CLINIC | Age: 71
End: 2021-11-04

## 2021-11-04 DIAGNOSIS — Z74.09 IMPAIRED FUNCTIONAL MOBILITY AND ACTIVITY TOLERANCE: ICD-10-CM

## 2021-11-04 DIAGNOSIS — M25.511 RIGHT SHOULDER PAIN, UNSPECIFIED CHRONICITY: Primary | ICD-10-CM

## 2021-11-04 DIAGNOSIS — M19.019 ARTHRITIS OF SHOULDER: ICD-10-CM

## 2021-11-04 PROCEDURE — 97110 THERAPEUTIC EXERCISES: CPT | Performed by: PHYSICAL THERAPIST

## 2021-11-04 PROCEDURE — G0283 ELEC STIM OTHER THAN WOUND: HCPCS | Performed by: PHYSICAL THERAPIST

## 2021-11-04 PROCEDURE — 97140 MANUAL THERAPY 1/> REGIONS: CPT | Performed by: PHYSICAL THERAPIST

## 2021-11-04 NOTE — PROGRESS NOTES
Physical Therapy Daily Progress Note      Visit # 11      Subjective   Left shoulder a little stiff.  R feels pretty good. Vacuuming made me a little sore.  Neck feels better.    Objective   See Exercise, Manual, and Modality Logs for complete treatment.       Assessment/Plan    No impingement noted today with PROM Left shoulder and with dec c/o pain overall.  ROM and strength progressing well.    Continue to progress as katerin and assess in 2 visits need to continue or D/C             Manual Therapy:    14     mins  81164;  Therapeutic Exercise:    41     mins  38887;       Estim   15 min 85842      Timed Treatment:   55   mins   Total Treatment:     70   mins    Lovely Guerra PT  Physical Therapist  [place] License #[id number]

## 2021-11-08 ENCOUNTER — TREATMENT (OUTPATIENT)
Dept: PHYSICAL THERAPY | Facility: CLINIC | Age: 71
End: 2021-11-08

## 2021-11-08 DIAGNOSIS — Z74.09 IMPAIRED FUNCTIONAL MOBILITY AND ACTIVITY TOLERANCE: ICD-10-CM

## 2021-11-08 DIAGNOSIS — M19.019 ARTHRITIS OF SHOULDER: ICD-10-CM

## 2021-11-08 DIAGNOSIS — M25.511 RIGHT SHOULDER PAIN, UNSPECIFIED CHRONICITY: Primary | ICD-10-CM

## 2021-11-08 PROCEDURE — 97140 MANUAL THERAPY 1/> REGIONS: CPT | Performed by: PHYSICAL THERAPIST

## 2021-11-08 PROCEDURE — G0283 ELEC STIM OTHER THAN WOUND: HCPCS | Performed by: PHYSICAL THERAPIST

## 2021-11-08 PROCEDURE — 97110 THERAPEUTIC EXERCISES: CPT | Performed by: PHYSICAL THERAPIST

## 2021-11-08 NOTE — PROGRESS NOTES
Physical Therapy Daily Progress Note      Visit # 12      Subjective   Had to take some IBP this weekend after holding cloudswave but ok today    Objective   See Exercise, Manual, and Modality Logs for complete treatment.       Assessment/Plan    No notable increase in pain from progression last session but added only one new ex today.  Some impingement noted B shoulder with PROM today but still moderately less than when started PT and ROM overall improved.    Reassess next visit               Manual Therapy:    14     mins  36961;  Therapeutic Exercise:    35     mins  76724;     Estim   15 min 25352      Timed Treatment:   49   mins   Total Treatment:     64   mins    Lovely Guerra PT  Physical Therapist  [place] License #[id number]

## 2021-11-11 ENCOUNTER — TREATMENT (OUTPATIENT)
Dept: PHYSICAL THERAPY | Facility: CLINIC | Age: 71
End: 2021-11-11

## 2021-11-11 DIAGNOSIS — Z74.09 IMPAIRED FUNCTIONAL MOBILITY AND ACTIVITY TOLERANCE: ICD-10-CM

## 2021-11-11 DIAGNOSIS — M25.511 RIGHT SHOULDER PAIN, UNSPECIFIED CHRONICITY: Primary | ICD-10-CM

## 2021-11-11 DIAGNOSIS — M19.019 ARTHRITIS OF SHOULDER: ICD-10-CM

## 2021-11-11 PROCEDURE — G0283 ELEC STIM OTHER THAN WOUND: HCPCS | Performed by: PHYSICAL THERAPIST

## 2021-11-11 PROCEDURE — 97140 MANUAL THERAPY 1/> REGIONS: CPT | Performed by: PHYSICAL THERAPIST

## 2021-11-11 PROCEDURE — 97110 THERAPEUTIC EXERCISES: CPT | Performed by: PHYSICAL THERAPIST

## 2021-11-11 NOTE — PROGRESS NOTES
Physical Therapy  Progress Note          11/11/2021  Cira Ugalde MD    Re: Teresa Valente  ________________________________________________________________    Ms. Teresa Valente, has attended 13 PT sessions.  Treatment has consisted of: progressive there-ex/act, HEP, manual, modalities and pt ed     S: Ms. Teresa Valente states: better.  Feel I'm probalby ready to do just do on my own because I will continue my exercises.  Still have some triggering LLF.      Subjective     Objective          Postural Observations  Seated posture: fair    Additional Postural Observation Details  FHP; inc cerv-thor kyphosis     Palpation   Left   Hypertonic in the upper trapezius.   Tenderness of the levator scapulae and upper trapezius.     Right   Hypertonic in the upper trapezius. Tenderness of the levator scapulae and upper trapezius.     Additional Palpation Details  B parascap and L GHJ    Cervical/Thoracic Screen   Cervical range of motion within normal limits with the following exceptions: All WFL without inc sxs    Active Range of Motion   Left Shoulder   Flexion: 148 degrees   Abduction: 139 degrees with pain  External rotation BTH: T3   Internal rotation BTB: T11     Right Shoulder   Flexion: 144 degrees   Abduction: 140 degrees with pain  External rotation BTH: T3 with pain  Internal rotation BTB: T11     Passive Range of Motion     Additional Passive Range of Motion Details  Left > WFL but R < WFL    Strength/Myotome Testing     Left Shoulder     Planes of Motion   Flexion: 5   Abduction: 5   External rotation at 0°: 5   Internal rotation at 0°: 5     Isolated Muscles   Middle trapezius: 5   Serratus anterior: 5     Right Shoulder     Planes of Motion   Flexion: 5   Abduction: 4+ (slight hiking)   External rotation at 0°: 5   Internal rotation at 0°: 5     Isolated Muscles   Middle trapezius: 5   Serratus anterior: 5     Tests     Left Shoulder   Positive Hawkin's.   Negative drop arm and empty can.     Right Shoulder    Negative drop arm, empty can and Hawkin's.     Additional Tests Details  Slight + [place] R L      See Exercise, Manual, and Modality Logs for complete treatment.   Quick Dash 13.64    Assessment/Plan     Good progress made toward goals but not fully met with left hand sxs improved but still present and still with mild impingement L shdr.  ROM and strength improved B shdrs.  Is independent with HEP and should be able to continue with improvement with this.    To try just HEP - to return only prn             Manual Therapy:    12     mins  01661;  Therapeutic Exercise:    43     mins  36061;     estim                15   mins 88701    Timed Treatment:   55   mins   Total Treatment:     70   mins    Lovely Guerra, PT  Physical Therapist

## 2022-03-01 ENCOUNTER — TREATMENT (OUTPATIENT)
Dept: PHYSICAL THERAPY | Facility: CLINIC | Age: 72
End: 2022-03-01

## 2022-03-01 DIAGNOSIS — Z74.09 IMPAIRED FUNCTIONAL MOBILITY AND ACTIVITY TOLERANCE: ICD-10-CM

## 2022-03-01 DIAGNOSIS — M75.42 SHOULDER IMPINGEMENT, LEFT: ICD-10-CM

## 2022-03-01 DIAGNOSIS — M25.512 LEFT SHOULDER PAIN, UNSPECIFIED CHRONICITY: Primary | ICD-10-CM

## 2022-03-01 DIAGNOSIS — M50.30 OTHER CERVICAL DISC DEGENERATION, UNSPECIFIED CERVICAL REGION: ICD-10-CM

## 2022-03-01 PROCEDURE — 97110 THERAPEUTIC EXERCISES: CPT | Performed by: PHYSICAL THERAPIST

## 2022-03-01 PROCEDURE — 97162 PT EVAL MOD COMPLEX 30 MIN: CPT | Performed by: PHYSICAL THERAPIST

## 2022-03-01 PROCEDURE — 97140 MANUAL THERAPY 1/> REGIONS: CPT | Performed by: PHYSICAL THERAPIST

## 2022-03-01 NOTE — PROGRESS NOTES
Physical Therapy Initial Evaluation and Plan of Care    Patient: Teresa Valente   : 1950  Diagnosis/ICD-10 Code:  Left shoulder pain, unspecified chronicity [M25.512]  Referring practitioner: Cira Ugalde MD  Date of Initial Visit: 3/1/2022  Today's Date: 3/1/2022  Patient seen for 1 session         Visit Diagnoses:    ICD-10-CM ICD-9-CM   1. Left shoulder pain, unspecified chronicity  M25.512 719.41   2. Other cervical disc degeneration, unspecified cervical region  M50.30 722.4   3. Shoulder impingement, left  M75.42 726.2   4. Impaired functional mobility and activity tolerance  Z74.09 V49.89         Subjective Questionnaire: QuickDASH: 40.9      Subjective Evaluation    History of Present Illness  Mechanism of injury: Pain in L shoulder for ~ a year which improved with PT ( MRI showed deg shoulder and neck but no RCT) ; fell on ice onto knees in early Feb and had to crawl to get to grass; no new imaging; Rx with heat, some exercises and NSAIDs and mm relaxer; also having intermittent N/T and blanching into pinky finger.    Other med Hx: R shdr RCT; breast Ca R side - in remission, asthma, vertigo      Patient Occupation: palliative care part time   Precautions and Work Restrictions: nonePain  Current pain rating: 3  At best pain ratin  At worst pain ratin  Location: top of left shoulder and into arm to elbow  Quality: knife-like and sharp  Relieving factors: medications, heat and rest  Aggravating factors: overhead activity, outstretched reach, lifting and sleeping (behind back, lay on left side)  Progression: improved    Hand dominance: left    Treatments  Previous treatment: medication (some ex from prior PT)  Patient Goals  Patient goals for therapy: decreased pain and increased motion  Patient goal: return to normal activities           Objective          Palpation   Left   Hypertonic in the scalenes and upper trapezius.   Tenderness of the upper trapezius.     Right   Hypertonic in the  upper trapezius.     Tenderness     Left Shoulder   Tenderness in the infraspinatus tendon, subacromial bursa and supraspinatus tendon.     Cervical/Thoracic Screen   Cervical range of motion within normal limits with the following exceptions: All WFL with local pain only with ext    Active Range of Motion   Left Shoulder   Flexion: 127 degrees   Abduction: 115 (scap plane) degrees   External rotation BTH: T2   Internal rotation BTB: L2     Passive Range of Motion   Left Shoulder   Flexion: 120 degrees with pain  Abduction: 135 degrees   External rotation 45°: 72 degrees   External rotation 90°: 75 degrees   Internal rotation 45°: 65 degrees     Strength/Myotome Testing     Left Shoulder     Planes of Motion   Left shoulder forward flexion strength: 5-/5.   Abduction: 4+   External rotation at 0°: 5   Internal rotation at 0°: 5     Tests   Cervical     Left   Positive Spurling's sign.     Left Shoulder   Positive empty can, Hawkin's and Speed's.   Negative drop arm.     Additional Tests Details  With Spurling sxs radiated down to elbow - tingling; neg Tinel at [place] and Guyons tunnel;   Pain with ER vs IR with O'Briens          Assessment & Plan     Assessment  Impairments: abnormal muscle tone, abnormal or restricted ROM, activity intolerance, impaired physical strength and pain with function  Functional Limitations: lifting, sleeping, pulling, pushing, reaching behind back and reaching overhead  Assessment details: 73 yo F with prior Hx of B shoulder pain/injury and PT with new incident left shoulder pain and LUE paresthesias from having to crawl on hands/knees after a fall a few weeks ago.  Signs/sxs consistent with shoulder impingement, mild nerve compression with tightness shoulder and cervical-thor mm.  Possible but not obvious tear.  Also with LUE paresthesias with possible compression at C-spine.  Prognosis: good    Goals  Plan Goals: STGs x 2 wks  1. ROM improving with pain < 6/10  2. Decreasing signs/sxs  impingement  3. Review posture and body mechanics with ADLs  4. Decreased frequency/duration of UE paresthesias    LTGs x 4 wks  1. ROM restored to WFL or pre-injury levels   2. Strength per MMT  4+/5  3. Negative impingement and instability testing  4. Min to no remaining paresthesias  5. Improved Quick Dash > 10% to indicate improved ADLs    Plan  Therapy options: will be seen for skilled therapy services  Planned modality interventions: cryotherapy, thermotherapy (hydrocollator packs) and ultrasound  Planned therapy interventions: therapeutic activities, stretching, strengthening, neuromuscular re-education, manual therapy, home exercise program and body mechanics training  Frequency: 2x week  Duration in weeks: 4  Treatment plan discussed with: patient        History # of Personal Factors and/or Comorbidities: HIGH (3+)  Examination of Body System(s): # of elements: LOW (1-2)  Clinical Presentation: STABLE   Clinical Decision Making: MODERATE      Timed:         Manual Therapy:    8     mins  20653;     Therapeutic Exercise:    12     mins  17554;     Neuromuscular [place]:    0    mins  31455;    Therapeutic Activity:     5     mins  38496;           Un-Timed:  Electrical Stimulation:    0     mins  41722 ( );  Dry Needling     0     mins self-pay  Traction     0     mins 27651  Low Eval     0     Mins  35783  Mod Eval     30     Mins  75677  High Eval                       0     Mins  91774        Timed Treatment:   25   mins   Total Treatment:     55   mins          PT: Lovely Guerra PT     License Number: [id number]  Electronically signed by Lovely Guerra PT, 03/01/22, 10:44 AM EST    Certification Period: 3/1/2022 thru 5/29/2022  I certify that the therapy services are furnished while this patient is under my care.  The services outlined above are required by this patient, and will be reviewed every 90 days.         Physician Signature:__________________________________________________    PHYSICIAN:  Cira Ugalde MD  NPI: [id number]                                      DATE:      Please sign and return via fax to [email or web address] . Thank you, [facility].

## 2022-03-07 ENCOUNTER — TREATMENT (OUTPATIENT)
Dept: PHYSICAL THERAPY | Facility: CLINIC | Age: 72
End: 2022-03-07

## 2022-03-07 DIAGNOSIS — Z74.09 IMPAIRED FUNCTIONAL MOBILITY AND ACTIVITY TOLERANCE: ICD-10-CM

## 2022-03-07 DIAGNOSIS — M25.512 LEFT SHOULDER PAIN, UNSPECIFIED CHRONICITY: Primary | ICD-10-CM

## 2022-03-07 DIAGNOSIS — M75.42 SHOULDER IMPINGEMENT, LEFT: ICD-10-CM

## 2022-03-07 DIAGNOSIS — M50.30 OTHER CERVICAL DISC DEGENERATION, UNSPECIFIED CERVICAL REGION: ICD-10-CM

## 2022-03-07 PROCEDURE — 97110 THERAPEUTIC EXERCISES: CPT | Performed by: PHYSICAL THERAPIST

## 2022-03-07 PROCEDURE — 97530 THERAPEUTIC ACTIVITIES: CPT | Performed by: PHYSICAL THERAPIST

## 2022-03-07 PROCEDURE — 97140 MANUAL THERAPY 1/> REGIONS: CPT | Performed by: PHYSICAL THERAPIST

## 2022-03-07 NOTE — PROGRESS NOTES
Physical Therapy Daily Treatment Note      Patient: Teresa Valente   : 1950  Referring practitioner: Cira Ugalde MD  Date of Initial Visit: Type: THERAPY  Noted: 3/1/2022  Today's Date: 3/7/2022  Patient seen for 2 sessions       Visit Diagnoses:    ICD-10-CM ICD-9-CM   1. Left shoulder pain, unspecified chronicity  M25.512 719.41   2. Other cervical disc degeneration, unspecified cervical region  M50.30 722.4   3. Shoulder impingement, left  M75.42 726.2   4. Impaired functional mobility and activity tolerance  Z74.09 V49.89       Subjective   Pt reports that her shoulder is feeling a little better.  No significant sleep disturbance.  Still having tingling into my hand - all fingers over the weekend.   Objective          Palpation     Right   No palpable tenderness to the pectoralis minor and upper trapezius. Tenderness of the supraspinatus.     Tenderness     Right Shoulder  Tenderness in the biceps tendon (proximal), subacromial bursa and supraspinatus tendon.       See Exercise, Manual, and Modality Logs for complete treatment.       Assessment/Plan   modified scalene stretch today for improved comfort.  Notable pain end range scaption and ER with soft end feel.  Added pulsed US to address impingement signs.  We discussed activity modifications in warm water pool at [place].   Progress per Plan of Care             Timed:         Manual Therapy:    10     mins  07696;     Therapeutic Exercise:    16     mins  23608;     Neuromuscular [place]:   -     mins  12419;    Therapeutic Activity:     8     mins  27890;     Gait Training:      -     mins  02934;     Ultrasound:     8     mins  75630;        Timed Treatment:   42   mins   Total Treatment:     42   mins    Letty Randall, PT  [place] License: #[id number]

## 2022-03-07 NOTE — PATIENT INSTRUCTIONS
Access Code: [email or web address]  URL: [email or web address]  Date: 03/07/2022  Prepared by: Letty Randall    Exercises  Seated Scalene Stretch with Towel - 2 x daily - 1 sets - 10 reps - 5 hold  Standing Bent Over Single Arm Scapular Row with Table Support - 2 x daily - 1 sets - 15 reps - 5 hold  Seated Scapular Retraction - 2 x daily - 1 sets - 10 reps - 5 hold  Median Nerve Flossing - 2 x daily - 1 sets - 15 reps  Seated Cervical Retraction - 2 x daily - 1 sets - 10 reps - 5 hold  Circular Shoulder Pendulum with Table Support - 2 x daily - 2 sets - 15 reps

## 2022-03-09 ENCOUNTER — TREATMENT (OUTPATIENT)
Dept: PHYSICAL THERAPY | Facility: CLINIC | Age: 72
End: 2022-03-09

## 2022-03-09 DIAGNOSIS — M75.42 SHOULDER IMPINGEMENT, LEFT: ICD-10-CM

## 2022-03-09 DIAGNOSIS — Z74.09 IMPAIRED FUNCTIONAL MOBILITY AND ACTIVITY TOLERANCE: ICD-10-CM

## 2022-03-09 DIAGNOSIS — M25.512 LEFT SHOULDER PAIN, UNSPECIFIED CHRONICITY: Primary | ICD-10-CM

## 2022-03-09 DIAGNOSIS — M50.30 OTHER CERVICAL DISC DEGENERATION, UNSPECIFIED CERVICAL REGION: ICD-10-CM

## 2022-03-09 PROCEDURE — 97530 THERAPEUTIC ACTIVITIES: CPT | Performed by: PHYSICAL THERAPIST

## 2022-03-09 PROCEDURE — 97140 MANUAL THERAPY 1/> REGIONS: CPT | Performed by: PHYSICAL THERAPIST

## 2022-03-09 PROCEDURE — 97110 THERAPEUTIC EXERCISES: CPT | Performed by: PHYSICAL THERAPIST

## 2022-03-09 NOTE — PROGRESS NOTES
Physical Therapy Daily Treatment Note      Patient: Teresa Valente   : 1950  Referring practitioner: Cira Ugalde MD  Date of Initial Visit: Type: THERAPY  Noted: 3/1/2022  Today's Date: 3/9/2022  Patient seen for 3 sessions       Visit Diagnoses:    ICD-10-CM ICD-9-CM   1. Left shoulder pain, unspecified chronicity  M25.512 719.41   2. Other cervical disc degeneration, unspecified cervical region  M50.30 722.4   3. Shoulder impingement, left  M75.42 726.2   4. Impaired functional mobility and activity tolerance  Z74.09 V49.89       Subjective   Pt reports some improvement in symptoms.  Is seeing ENT later this afternoon to address vertigo complaints.   Objective   See Exercise, Manual, and Modality Logs for complete treatment.       Assessment/Plan   Improved tolerance to manual interventions today though with continued trigger pt along supraspinatus m.  Attempted s/l shoulder ER but too painful despite limiting ROM but was able to tolerate submax isometrics. Pt was issued updated HEP printout to facilitate compliance and recall with ex progressions today.  Progress per Plan of Care    Timed:         Manual Therapy:    10     mins  58601;     Therapeutic Exercise:    15     mins  61325;     Neuromuscular [place]:   -     mins  01619;    Therapeutic Activity:     8     mins  95090;     Gait Training:      -     mins  99321;     Ultrasound:     8     mins  61294;        Timed Treatment:   41   mins   Total Treatment:     41   mins      Letty Randall PT  [place] License: #[id number]

## 2022-03-14 ENCOUNTER — TREATMENT (OUTPATIENT)
Dept: PHYSICAL THERAPY | Facility: CLINIC | Age: 72
End: 2022-03-14

## 2022-03-14 DIAGNOSIS — Z74.09 IMPAIRED FUNCTIONAL MOBILITY AND ACTIVITY TOLERANCE: ICD-10-CM

## 2022-03-14 DIAGNOSIS — M50.30 OTHER CERVICAL DISC DEGENERATION, UNSPECIFIED CERVICAL REGION: ICD-10-CM

## 2022-03-14 DIAGNOSIS — M75.42 SHOULDER IMPINGEMENT, LEFT: ICD-10-CM

## 2022-03-14 DIAGNOSIS — M25.512 LEFT SHOULDER PAIN, UNSPECIFIED CHRONICITY: Primary | ICD-10-CM

## 2022-03-14 PROCEDURE — 97110 THERAPEUTIC EXERCISES: CPT | Performed by: PHYSICAL THERAPIST

## 2022-03-14 PROCEDURE — 97140 MANUAL THERAPY 1/> REGIONS: CPT | Performed by: PHYSICAL THERAPIST

## 2022-03-14 NOTE — PROGRESS NOTES
Physical Therapy Daily Treatment Note      Patient: Teresa Valente   : 1950  Referring practitioner: Cira Ugalde MD  Date of Initial Visit: Type: THERAPY  Noted: 3/1/2022  Today's Date: 3/14/2022  Patient seen for 4 sessions       Visit Diagnoses:    ICD-10-CM ICD-9-CM   1. Left shoulder pain, unspecified chronicity  M25.512 719.41   2. Other cervical disc degeneration, unspecified cervical region  M50.30 722.4   3. Shoulder impingement, left  M75.42 726.2   4. Impaired functional mobility and activity tolerance  Z74.09 V49.89       Subjective   I saw the ENT and my dizziness is much better.  My shoulder is also improving though I still have difficulty washing my back and reaching up to turn on the light on my ceiling fan.. Noticing less frequent episodes of N/T into hand  Objective   See Exercise, Manual, and Modality Logs for complete treatment.   Palpation - supraspinatus muscle, LHB/SA space    Assessment/Plan   Pt was able to tolerate s/l ER today though cautioned to limit range to prevent symptoms.  Soft end feel in all planes of manual stretching though no muscle guarding. Neural signs also decreasing.   Pt did notes some soreness following US after ex program so encouraged to use ice later this afternoon if symptoms persist.   Progress strengthening /stabilization /functional activity    Timed:         Manual Therapy:    10     mins  06866;     Therapeutic Exercise:    26     mins  16966;     Neuromuscular [place]:   -     mins  95182;    Therapeutic Activity:     -     mins  71153;     Gait Training:      -     mins  59201;     Ultrasound:     8     mins  51202;        Timed Treatment:   44   mins   Total Treatment:     44   mins    BETINA Willingham License: #[id number]

## 2022-03-16 ENCOUNTER — TREATMENT (OUTPATIENT)
Dept: PHYSICAL THERAPY | Facility: CLINIC | Age: 72
End: 2022-03-16

## 2022-03-16 DIAGNOSIS — M75.42 SHOULDER IMPINGEMENT, LEFT: ICD-10-CM

## 2022-03-16 DIAGNOSIS — M50.30 OTHER CERVICAL DISC DEGENERATION, UNSPECIFIED CERVICAL REGION: ICD-10-CM

## 2022-03-16 DIAGNOSIS — M25.512 LEFT SHOULDER PAIN, UNSPECIFIED CHRONICITY: Primary | ICD-10-CM

## 2022-03-16 DIAGNOSIS — Z74.09 IMPAIRED FUNCTIONAL MOBILITY AND ACTIVITY TOLERANCE: ICD-10-CM

## 2022-03-16 PROCEDURE — 97140 MANUAL THERAPY 1/> REGIONS: CPT | Performed by: PHYSICAL THERAPIST

## 2022-03-16 PROCEDURE — 97110 THERAPEUTIC EXERCISES: CPT | Performed by: PHYSICAL THERAPIST

## 2022-03-16 PROCEDURE — 97035 APP MDLTY 1+ULTRASOUND EA 15: CPT | Performed by: PHYSICAL THERAPIST

## 2022-03-16 NOTE — PROGRESS NOTES
Physical Therapy Daily Progress Note      Visit # 5      Subjective   Neck is better and so is shoulder.  Less N/T but still some triggering middle finger.    Objective   See Exercise, Manual, and Modality Logs for complete treatment.       Assessment/Plan    Overall improved but still with intermittent nerve sxs, mild impingement and cervical-scapular tenderness and TPs.  ROM improves with mobs.  Tolerated light progression of ex.    Progress strength/stability as katerin             Manual Therapy:    14     mins  12176;  Therapeutic Exercise:    32     mins  14522;     Neuromuscular [place]:    00    mins  81970;    Therapeutic Activity:     0     mins  82754;     Gait Trainin     mins  48313;     Ultrasound:     8     mins  51655;          Timed Treatment:   54   mins   Total Treatment:     64   mins    Lovely Guerra PT  Physical Therapist  [place] License #[id number]

## 2022-03-22 ENCOUNTER — TREATMENT (OUTPATIENT)
Dept: PHYSICAL THERAPY | Facility: CLINIC | Age: 72
End: 2022-03-22

## 2022-03-22 DIAGNOSIS — M25.512 LEFT SHOULDER PAIN, UNSPECIFIED CHRONICITY: Primary | ICD-10-CM

## 2022-03-22 DIAGNOSIS — M75.42 SHOULDER IMPINGEMENT, LEFT: ICD-10-CM

## 2022-03-22 DIAGNOSIS — Z74.09 IMPAIRED FUNCTIONAL MOBILITY AND ACTIVITY TOLERANCE: ICD-10-CM

## 2022-03-22 PROCEDURE — 97140 MANUAL THERAPY 1/> REGIONS: CPT | Performed by: PHYSICAL THERAPIST

## 2022-03-22 PROCEDURE — 97110 THERAPEUTIC EXERCISES: CPT | Performed by: PHYSICAL THERAPIST

## 2022-03-22 PROCEDURE — 97035 APP MDLTY 1+ULTRASOUND EA 15: CPT | Performed by: PHYSICAL THERAPIST

## 2022-03-22 NOTE — PROGRESS NOTES
Physical Therapy Daily Progress Note      Visit # 6      Subjective   Both shoulders sore from carrying grandchildren down steps this weekend.    Objective   See Exercise, Manual, and Modality Logs for complete treatment.       Assessment/Plan    Flared up from carrying this weekend with increased pain and impingement with PROM initially but improved with manual.  Then tolerated light progression of ex.    Continue to progress per POC as katerin               Manual Therapy:    17     mins  89509;  Therapeutic Exercise:    36     mins  52205;     Neuromuscular [place]:    0    mins  46684;    Therapeutic Activity:     0     mins  71309;     Gait Trainin     mins  77855;     Ultrasound:     8     mins  98102;    Work Hardening           0      mins 29768  Iontophoresis               0   mins 72331  Estim   0 min 40293      Timed Treatment:   61   mins   Total Treatment:     61   mins    Lovely Guerra PT  Physical Therapist  [place] License #[id number]

## 2022-03-24 ENCOUNTER — TREATMENT (OUTPATIENT)
Dept: PHYSICAL THERAPY | Facility: CLINIC | Age: 72
End: 2022-03-24

## 2022-03-24 DIAGNOSIS — M25.512 LEFT SHOULDER PAIN, UNSPECIFIED CHRONICITY: Primary | ICD-10-CM

## 2022-03-24 DIAGNOSIS — Z74.09 IMPAIRED FUNCTIONAL MOBILITY AND ACTIVITY TOLERANCE: ICD-10-CM

## 2022-03-24 DIAGNOSIS — M75.42 SHOULDER IMPINGEMENT, LEFT: ICD-10-CM

## 2022-03-24 PROCEDURE — 97140 MANUAL THERAPY 1/> REGIONS: CPT | Performed by: PHYSICAL THERAPIST

## 2022-03-24 PROCEDURE — 97110 THERAPEUTIC EXERCISES: CPT | Performed by: PHYSICAL THERAPIST

## 2022-03-24 NOTE — PROGRESS NOTES
Physical Therapy Daily Progress Note      Visit # 7      Subjective   Feels better.    Objective   See Exercise, Manual, and Modality Logs for complete treatment.       Assessment/Plan    Improved PROM vs last session - flare-up resolving.  Did not progress ex today but tolerated progression from last session.    Continue to progress per POC             Manual Therapy:    13     mins  40077;  Therapeutic Exercise:    30     mins  90594;     Neuromuscular [place]:    0    mins  82615;    Therapeutic Activity:     0     mins  50492;     Gait Trainin     mins  71606;     Ultrasound:     8     mins  20108;    Work Hardening           0      mins 53409  Iontophoresis               0   mins 51539  Estim   0 min 04660      Timed Treatment:  51    mins   Total Treatment:     51   mins    Lovely Guerra PT  Physical Therapist  [place] License #[id number]

## 2022-03-29 ENCOUNTER — TREATMENT (OUTPATIENT)
Dept: PHYSICAL THERAPY | Facility: CLINIC | Age: 72
End: 2022-03-29

## 2022-03-29 DIAGNOSIS — Z74.09 IMPAIRED FUNCTIONAL MOBILITY AND ACTIVITY TOLERANCE: ICD-10-CM

## 2022-03-29 DIAGNOSIS — M75.42 SHOULDER IMPINGEMENT, LEFT: ICD-10-CM

## 2022-03-29 DIAGNOSIS — M25.512 LEFT SHOULDER PAIN, UNSPECIFIED CHRONICITY: Primary | ICD-10-CM

## 2022-03-29 PROCEDURE — 97110 THERAPEUTIC EXERCISES: CPT | Performed by: PHYSICAL THERAPIST

## 2022-03-29 PROCEDURE — 97140 MANUAL THERAPY 1/> REGIONS: CPT | Performed by: PHYSICAL THERAPIST

## 2022-03-29 NOTE — PROGRESS NOTES
Physical Therapy Daily Progress Note      Visit # 8      Subjective   Tight. Pain still sitting with arm unsupported.    Objective   See Exercise, Manual, and Modality Logs for complete treatment.       Assessment/Plan    Moderate tension and tenderness in/around left levator mm.  That and shoulder ROM improved with manual intervention.  Tolerated light progression of ex but had to stop one ex due to pain and crepitus noted at scapula (band wall walks).    Progress scap stabilizers and mobs as katerin              Manual Therapy:    12     mins  00389;  Therapeutic Exercise:    29     mins  56694;     Neuromuscular [place]:    0    mins  02148;    Therapeutic Activity:     0     mins  60461;     Gait Trainin     mins  41037;     Ultrasound:     8     mins  83994;    Work Hardening           0      mins 13389  Iontophoresis               0   mins 29816  Estim   0 min 13654      Timed Treatment:   49   mins   Total Treatment:     49   mins    Lovely Guerra PT  Physical Therapist  [place] License #[id number]

## 2022-04-01 ENCOUNTER — TREATMENT (OUTPATIENT)
Dept: PHYSICAL THERAPY | Facility: CLINIC | Age: 72
End: 2022-04-01

## 2022-04-01 DIAGNOSIS — M50.30 OTHER CERVICAL DISC DEGENERATION, UNSPECIFIED CERVICAL REGION: ICD-10-CM

## 2022-04-01 DIAGNOSIS — M25.512 LEFT SHOULDER PAIN, UNSPECIFIED CHRONICITY: Primary | ICD-10-CM

## 2022-04-01 DIAGNOSIS — Z74.09 IMPAIRED FUNCTIONAL MOBILITY AND ACTIVITY TOLERANCE: ICD-10-CM

## 2022-04-01 DIAGNOSIS — M75.42 SHOULDER IMPINGEMENT, LEFT: ICD-10-CM

## 2022-04-01 PROCEDURE — 97140 MANUAL THERAPY 1/> REGIONS: CPT | Performed by: PHYSICAL THERAPIST

## 2022-04-01 PROCEDURE — 97110 THERAPEUTIC EXERCISES: CPT | Performed by: PHYSICAL THERAPIST

## 2022-04-01 NOTE — PROGRESS NOTES
Physical Therapy Daily Progress Note      Visit # 9      Subjective   A little better    Objective   See Exercise, Manual, and Modality Logs for complete treatment.       Assessment/Plan    Still with some impingement but more localized.  Overall with gradual improvement in mobility and stability and functional use.    Continue with progression of strength/stability as katerin               Manual Therapy:    12     mins  92482;  Therapeutic Exercise:    30     mins  41717;     Neuromuscular [place]:    0    mins  28688;    Therapeutic Activity:     0     mins  54732;     Gait Trainin     mins  23798;     Ultrasound:     8     mins  60762;    Work Hardening           0      mins 81184  Iontophoresis               0   mins 53504  Estim   0 min 18587      Timed Treatment:   50   mins   Total Treatment:     50   mins    Lovely Guerra PT  Physical Therapist  [place] License #[id number]

## 2022-04-06 ENCOUNTER — TREATMENT (OUTPATIENT)
Dept: PHYSICAL THERAPY | Facility: CLINIC | Age: 72
End: 2022-04-06

## 2022-04-06 DIAGNOSIS — M75.42 SHOULDER IMPINGEMENT, LEFT: ICD-10-CM

## 2022-04-06 DIAGNOSIS — M25.512 LEFT SHOULDER PAIN, UNSPECIFIED CHRONICITY: Primary | ICD-10-CM

## 2022-04-06 DIAGNOSIS — Z74.09 IMPAIRED FUNCTIONAL MOBILITY AND ACTIVITY TOLERANCE: ICD-10-CM

## 2022-04-06 PROCEDURE — 97140 MANUAL THERAPY 1/> REGIONS: CPT | Performed by: PHYSICAL THERAPIST

## 2022-04-06 PROCEDURE — 97110 THERAPEUTIC EXERCISES: CPT | Performed by: PHYSICAL THERAPIST

## 2022-04-06 NOTE — PROGRESS NOTES
Physical Therapy Daily Progress Note      Visit # 10      Subjective   Hurting more from the rain    Objective   See Exercise, Manual, and Modality Logs for complete treatment.       Assessment/Plan    Impingement with FF today but others OK.  Improved with mobs.  Flared-up some from weather changes so no increase today.    Progress strength/stability as katerin             Manual Therapy:    13     mins  75113;  Therapeutic Exercise:    25     mins  02587;     Neuromuscular [place]:    0    mins  89941;    Therapeutic Activity:     0     mins  53908;     Gait Trainin     mins  44450;     Ultrasound:     8     mins  14311;    Work Hardening           0      mins 33767  Iontophoresis               0   mins 79134  Estim   0 min 36083      Timed Treatment:   46   mins   Total Treatment:     46   mins    Lovely Guerra PT  Physical Therapist  [place] License #[id number]

## 2022-04-08 ENCOUNTER — TREATMENT (OUTPATIENT)
Dept: PHYSICAL THERAPY | Facility: CLINIC | Age: 72
End: 2022-04-08

## 2022-04-08 DIAGNOSIS — M75.42 SHOULDER IMPINGEMENT, LEFT: ICD-10-CM

## 2022-04-08 DIAGNOSIS — M50.30 OTHER CERVICAL DISC DEGENERATION, UNSPECIFIED CERVICAL REGION: ICD-10-CM

## 2022-04-08 DIAGNOSIS — Z74.09 IMPAIRED FUNCTIONAL MOBILITY AND ACTIVITY TOLERANCE: ICD-10-CM

## 2022-04-08 DIAGNOSIS — M25.512 LEFT SHOULDER PAIN, UNSPECIFIED CHRONICITY: Primary | ICD-10-CM

## 2022-04-08 PROCEDURE — 97110 THERAPEUTIC EXERCISES: CPT | Performed by: PHYSICAL THERAPIST

## 2022-04-08 PROCEDURE — 97140 MANUAL THERAPY 1/> REGIONS: CPT | Performed by: PHYSICAL THERAPIST

## 2022-04-08 PROCEDURE — 97035 APP MDLTY 1+ULTRASOUND EA 15: CPT | Performed by: PHYSICAL THERAPIST

## 2022-04-08 NOTE — PROGRESS NOTES
Physical Therapy Daily Progress Note      Visit # 11      Subjective   I feel pretty good today. The other night reached for a bag in freezer and small finger blanched for about 5 minutes.  Still a little numb now.    Objective   See Exercise, Manual, and Modality Logs for complete treatment.       Assessment/Plan    Continues with overall improvement but still mild impingement and distal paresthesias.  Slow progression of strengthening. Fatigued at end. Relief with US.    Progress as katerin             Manual Therapy:    14     mins  38047;  Therapeutic Exercise:    33     mins  98598;     Neuromuscular [place]:    0    mins  24459;    Therapeutic Activity:     0     mins  37019;     Gait Trainin     mins  03580;     Ultrasound:     8     mins  44772;    Work Hardening           0      mins 06828  Iontophoresis               0   mins 80216  Estim   0 min 68589      Timed Treatment:   55   mins   Total Treatment:     55   mins    Lovely Guerra PT  Physical Therapist  [place] License #[id number]

## 2022-04-12 ENCOUNTER — TREATMENT (OUTPATIENT)
Dept: PHYSICAL THERAPY | Facility: CLINIC | Age: 72
End: 2022-04-12

## 2022-04-12 DIAGNOSIS — Z74.09 IMPAIRED FUNCTIONAL MOBILITY AND ACTIVITY TOLERANCE: ICD-10-CM

## 2022-04-12 DIAGNOSIS — M75.42 SHOULDER IMPINGEMENT, LEFT: ICD-10-CM

## 2022-04-12 DIAGNOSIS — M50.30 OTHER CERVICAL DISC DEGENERATION, UNSPECIFIED CERVICAL REGION: ICD-10-CM

## 2022-04-12 DIAGNOSIS — M25.512 LEFT SHOULDER PAIN, UNSPECIFIED CHRONICITY: Primary | ICD-10-CM

## 2022-04-12 PROCEDURE — 97140 MANUAL THERAPY 1/> REGIONS: CPT | Performed by: PHYSICAL THERAPIST

## 2022-04-12 PROCEDURE — 97110 THERAPEUTIC EXERCISES: CPT | Performed by: PHYSICAL THERAPIST

## 2022-04-12 NOTE — PROGRESS NOTES
Physical Therapy Daily Progress Note      Visit # 12      Subjective   Sore all over from the rain    Objective   See Exercise, Manual, and Modality Logs for complete treatment.       Assessment/Plan    Increased impingement today with PROM and report of increased soreness in general.  Tolerated advanced scapular strengthening and increased resistance with isometrics.  Reassess next visit           Manual Therapy:    13     mins  04024;  Therapeutic Exercise:    30     mins  28833;     Neuromuscular [place]:    0    mins  09352;    Therapeutic Activity:     0     mins  64352;     Gait Trainin     mins  41445;     Ultrasound:     8     mins  21943;    Work Hardening           0      mins 74353  Iontophoresis               0   mins 17754  Estim   0 min 44280      Timed Treatment:   51   mins   Total Treatment:     51   mins    Lovely Guerra PT  Physical Therapist  [place] License #[id number]

## 2022-04-15 ENCOUNTER — TREATMENT (OUTPATIENT)
Dept: PHYSICAL THERAPY | Facility: CLINIC | Age: 72
End: 2022-04-15

## 2022-04-15 DIAGNOSIS — M75.42 SHOULDER IMPINGEMENT, LEFT: ICD-10-CM

## 2022-04-15 DIAGNOSIS — M25.512 LEFT SHOULDER PAIN, UNSPECIFIED CHRONICITY: Primary | ICD-10-CM

## 2022-04-15 DIAGNOSIS — M50.30 OTHER CERVICAL DISC DEGENERATION, UNSPECIFIED CERVICAL REGION: ICD-10-CM

## 2022-04-15 DIAGNOSIS — Z74.09 IMPAIRED FUNCTIONAL MOBILITY AND ACTIVITY TOLERANCE: ICD-10-CM

## 2022-04-15 PROCEDURE — 97140 MANUAL THERAPY 1/> REGIONS: CPT | Performed by: PHYSICAL THERAPIST

## 2022-04-15 PROCEDURE — 97530 THERAPEUTIC ACTIVITIES: CPT | Performed by: PHYSICAL THERAPIST

## 2022-04-15 PROCEDURE — 97110 THERAPEUTIC EXERCISES: CPT | Performed by: PHYSICAL THERAPIST

## 2022-04-15 NOTE — PROGRESS NOTES
Physical Therapy  Progress Note          4/15/2022  Cira Ugalde MD    Re: Teresa Valente  ________________________________________________________________    Ms. Teresa Valente, has attended 13 PT sessions.  Treatment has consisted of: gradually progressive there-ex, HEP, manual, modalities and pt ed     S: Ms. Teresa Valente states: better but still aching at times in the left shoulder area towards the back.  Neck is basically the same. The hand is a little better.        Subjective     Objective          Palpation   Left   Hypertonic in the levator scapulae and upper trapezius.   Tenderness of the levator scapulae and upper trapezius.     Right   Hypertonic in the upper trapezius.     Tenderness     Left Shoulder   Tenderness in the infraspinatus tendon, subacromial bursa and supraspinatus tendon.     Additional Tenderness Details  Lower left PS    Neurological Testing     Sensation   Cervical/Thoracic   Left   Intact: light touch    Right   Intact: light touch    Active Range of Motion   Left Shoulder   Flexion: 130 degrees   Abduction: 142 degrees   External rotation BTH: T3   Internal rotation BTB: T12     Additional Active Range of Motion Details  All WFL    Passive Range of Motion   Left Shoulder   Flexion: 170 degrees   Abduction: 170 degrees   External rotation 90°: 80 degrees with pain  Internal rotation 45°: 75 degrees     Strength/Myotome Testing   Cervical Spine     Left   Levator scapulae (C4): 5    Right   Levator scapulae (C4): 5    Left Shoulder     Planes of Motion   Left shoulder forward flexion strength: 5-/5.   Abduction: 4+   External rotation at 0°: 5   Internal rotation at 0°: 5     Isolated Muscles   Levator scapulae: 5     Right Shoulder     Isolated Muscles   Levator scapulae: 5     Left Elbow   Flexion: 5  Extension: 5    Right Elbow   Flexion: 5  Extension: 5    Tests   Cervical     Left   Positive Spurling's sign.     Left Shoulder   Positive Hawkin's and Speed's.   Negative drop arm.      Additional Tests Details  With Spurling only proximal pain   crepitus with just minimal movement at times; strain vs pain with Empty Can; Popping with O'Briens      See Exercise, Manual, and Modality Logs for complete treatment.       Assessment/Plan    Overall improved with increased ROM and strength and decreased UE sxs but still with deficits with all.  Needs continued ex to further improve with all.  Try reduced frequency and assess if ready for D/C to HEP.                   Manual Therapy:    10     mins  02585;  Therapeutic Exercise:    26     mins  47490;     Neuromuscular [place]:    0    mins  16789;    Therapeutic Activity:     10     mins  01655;     Gait Trainin     mins  10216;     Ultrasound:     8     mins  72884;    Work Hardening           0      mins 07375  Iontophoresis               0   mins 96072    Timed Treatment:   54   mins   Total Treatment:     54  mins    Lovely Guerra PT  Physical Therapist

## 2022-05-02 ENCOUNTER — TREATMENT (OUTPATIENT)
Dept: PHYSICAL THERAPY | Facility: CLINIC | Age: 72
End: 2022-05-02

## 2022-05-02 DIAGNOSIS — M25.512 LEFT SHOULDER PAIN, UNSPECIFIED CHRONICITY: Primary | ICD-10-CM

## 2022-05-02 DIAGNOSIS — Z74.09 IMPAIRED FUNCTIONAL MOBILITY AND ACTIVITY TOLERANCE: ICD-10-CM

## 2022-05-02 DIAGNOSIS — M75.42 SHOULDER IMPINGEMENT, LEFT: ICD-10-CM

## 2022-05-02 PROCEDURE — 97112 NEUROMUSCULAR REEDUCATION: CPT | Performed by: PHYSICAL THERAPIST

## 2022-05-02 PROCEDURE — 97110 THERAPEUTIC EXERCISES: CPT | Performed by: PHYSICAL THERAPIST

## 2022-05-02 NOTE — PROGRESS NOTES
Physical Therapy Daily Progress Note      Visit # 14      Subjective   Was doing well until I had my 2nd booster in the left arm a week ago today.  Feels like a low level electrical current that goes from front of shoulder into hand - feels neural.  Have been waking up on that arm some. Still hurts with rain.    Objective   See Exercise, Manual, and Modality Logs for complete treatment.       Assessment/Plan    Good PROM after brief mobs.  Improved neural sxs with glides.  Tolerated increased resistance on some band ex.  Overall improved but still with deficits.  Is motivated and compliant with HEP and may be able to self-manage at this point.    Return only prn               Manual Therapy:    2     mins  16746;  Therapeutic Exercise:    25     mins  02536;     Neuromuscular [place]:    9    mins  35674;    Therapeutic Activity:     0     mins  46686;     Gait Trainin     mins  89819;     Ultrasound:     8     mins  83932;    Work Hardening           0      mins 03895  Iontophoresis               0   mins 81432  Estim   0 min 71306      Timed Treatment:   44   mins   Total Treatment:     44   mins    Lovely Guerra PT  Physical Therapist  [place] License #[id number]